# Patient Record
Sex: MALE | Race: AMERICAN INDIAN OR ALASKA NATIVE
[De-identification: names, ages, dates, MRNs, and addresses within clinical notes are randomized per-mention and may not be internally consistent; named-entity substitution may affect disease eponyms.]

---

## 2018-02-01 ENCOUNTER — HOSPITAL ENCOUNTER (OUTPATIENT)
Dept: HOSPITAL 5 - SPVIMAG | Age: 68
Discharge: HOME | End: 2018-02-01
Attending: ORTHOPAEDIC SURGERY
Payer: MEDICARE

## 2018-02-01 DIAGNOSIS — M47.896: ICD-10-CM

## 2018-02-01 DIAGNOSIS — M16.11: Primary | ICD-10-CM

## 2018-02-01 NOTE — XRAY REPORT
XRAY RIGHT HIP TWO VIEWS: 02/01/18 12:30:00





CLINICAL: Right hip pain.



FINDINGS: No fracture or dislocation.Mild osteoarthritis with mild 

narrowing of the superolateral joint space and superior acetabular 

eburnation.  Similar changes in the left hip.  The pelvic bones are 

intact.  The SI joints are normal.  Benign soft tissue calcifications.  

Degenerative changes in the lower lumbar spine.



IMPRESSION: Mild osteoarthritis.